# Patient Record
Sex: FEMALE | Race: AMERICAN INDIAN OR ALASKA NATIVE
[De-identification: names, ages, dates, MRNs, and addresses within clinical notes are randomized per-mention and may not be internally consistent; named-entity substitution may affect disease eponyms.]

---

## 2017-02-14 ENCOUNTER — HOSPITAL ENCOUNTER (EMERGENCY)
Dept: HOSPITAL 5 - ED | Age: 32
Discharge: HOME | End: 2017-02-14
Payer: SELF-PAY

## 2017-02-14 VITALS — SYSTOLIC BLOOD PRESSURE: 124 MMHG | DIASTOLIC BLOOD PRESSURE: 72 MMHG

## 2017-02-14 DIAGNOSIS — O24.911: ICD-10-CM

## 2017-02-14 DIAGNOSIS — O20.9: Primary | ICD-10-CM

## 2017-02-14 DIAGNOSIS — O99.331: ICD-10-CM

## 2017-02-14 DIAGNOSIS — Z3A.01: ICD-10-CM

## 2017-02-14 LAB
ANION GAP SERPL CALC-SCNC: 18 MMOL/L
BASOPHILS NFR BLD AUTO: 0.5 % (ref 0–1.8)
BILIRUB UR QL STRIP: (no result)
BLOOD UR QL VISUAL: (no result)
BUN SERPL-MCNC: 6 MG/DL (ref 7–17)
BUN/CREAT SERPL: 15 %
CALCIUM SERPL-MCNC: 9.1 MG/DL (ref 8.4–10.2)
CHLORIDE SERPL-SCNC: 94.6 MMOL/L (ref 98–107)
CO2 SERPL-SCNC: 23 MMOL/L (ref 22–30)
EOSINOPHIL NFR BLD AUTO: 1.7 % (ref 0–4.3)
GLUCOSE SERPL-MCNC: 362 MG/DL (ref 65–100)
HCT VFR BLD CALC: 39.6 % (ref 30.3–42.9)
HGB BLD-MCNC: 12.9 GM/DL (ref 10.1–14.3)
KETONES UR STRIP-MCNC: (no result) MG/DL
LEUKOCYTE ESTERASE UR QL STRIP: (no result)
MCH RBC QN AUTO: 28 PG (ref 28–32)
MCHC RBC AUTO-ENTMCNC: 33 % (ref 30–34)
MCV RBC AUTO: 86 FL (ref 79–97)
NITRITE UR QL STRIP: (no result)
PH UR STRIP: 5 [PH] (ref 5–7)
PLATELET # BLD: 193 K/MM3 (ref 140–440)
POTASSIUM SERPL-SCNC: 4.1 MMOL/L (ref 3.6–5)
PROT UR STRIP-MCNC: (no result) MG/DL
RBC # BLD AUTO: 4.62 M/MM3 (ref 3.65–5.03)
RBC #/AREA URNS HPF: 2 /HPF (ref 0–6)
SODIUM SERPL-SCNC: 131 MMOL/L (ref 137–145)
UROBILINOGEN UR-MCNC: < 2 MG/DL (ref ?–2)
WBC # BLD AUTO: 5.7 K/MM3 (ref 4.5–11)
WBC #/AREA URNS HPF: 1 /HPF (ref 0–6)

## 2017-02-14 PROCEDURE — 96361 HYDRATE IV INFUSION ADD-ON: CPT

## 2017-02-14 PROCEDURE — 82962 GLUCOSE BLOOD TEST: CPT

## 2017-02-14 PROCEDURE — 82010 KETONE BODYS QUAN: CPT

## 2017-02-14 PROCEDURE — 86901 BLOOD TYPING SEROLOGIC RH(D): CPT

## 2017-02-14 PROCEDURE — 84702 CHORIONIC GONADOTROPIN TEST: CPT

## 2017-02-14 PROCEDURE — 36415 COLL VENOUS BLD VENIPUNCTURE: CPT

## 2017-02-14 PROCEDURE — 85025 COMPLETE CBC W/AUTO DIFF WBC: CPT

## 2017-02-14 PROCEDURE — 80048 BASIC METABOLIC PNL TOTAL CA: CPT

## 2017-02-14 PROCEDURE — 99284 EMERGENCY DEPT VISIT MOD MDM: CPT

## 2017-02-14 PROCEDURE — 76801 OB US < 14 WKS SINGLE FETUS: CPT

## 2017-02-14 PROCEDURE — 86850 RBC ANTIBODY SCREEN: CPT

## 2017-02-14 PROCEDURE — 81001 URINALYSIS AUTO W/SCOPE: CPT

## 2017-02-14 PROCEDURE — 82805 BLOOD GASES W/O2 SATURATION: CPT

## 2017-02-14 PROCEDURE — 76817 TRANSVAGINAL US OBSTETRIC: CPT

## 2017-02-14 PROCEDURE — 96374 THER/PROPH/DIAG INJ IV PUSH: CPT

## 2017-02-14 PROCEDURE — 86900 BLOOD TYPING SEROLOGIC ABO: CPT

## 2017-02-14 NOTE — EMERGENCY DEPARTMENT REPORT
HPI





- General


Chief Complaint: Vaginal Bleeding


Time Seen by Provider: 02/14/17 10:20





- HPI


HPI: 





 


Chief complaint: Vaginal bleeding


HPI: Patient is a 31-year-old female with no previous pregnancies he states her 

last menstrual period started 12/31/2016 and had a positive home pregnancy 

test.  Patient states she occasionally vomits after eating for the last several 

days and for the last 2 weeks had polyuria and polydipsia.  Patient has no 

previous history of diabetes states her father does have diabetes.  Patient is 

unaware for mother had gestational diabetes.  Patient states she has 

intermittent lower abdominal cramping that is very mild and none currently.  

Patient states she had slight spotting yesterday but has not had any bleeding 

today.


Mode of arrival:   private car


Source: Patient


Began: Yesterday


Duration: One day


Context: See above


Quality: No pain currently


Severity: 0 out of 10


Improved with: Nothing


Worsened with: Nothing


Associated signs and symptoms: See above 





ED Past Medical Hx





- Past Medical History


Previous Medical History?: No





- Family History


Family history: diabetes (other)





- Social History


Smoking Status: Former Smoker


Substance Use Type: Alcohol (occasional currently not drinking)





- Medications


Home Medications: 


 Home Medications











 Medication  Instructions  Recorded  Confirmed  Last Taken  Type


 


glyBURIDE [Diabeta] 5 mg PO QHS #30 tablet 02/14/17  Unknown Rx














ED Review of Systems


ROS: 


Stated complaint: VAG BLEED, 6 WKS PREG


Other details as noted in HPI


ROS





Constitutional: No fever 


ENT: No uri symptoms


Cardiovascular: No chest pain


Respiratory: No sob or cough


GI: No diarrhea


: No dysuria or urgency, 


Skin: No rash


Neuro: No focal weakness or numbness


Psych: No depression


Hema/lymph: No edema





Physical Exam





- Physical Exam


Vital Signs: 


 Vital Signs











  02/14/17





  04:58


 


Temperature 98.1 F


 


Pulse Rate 88


 


Respiratory 20





Rate 


 


Blood Pressure 154/104


 


O2 Sat by Pulse 100





Oximetry 











Physical Exam: 





GENERAL: The patient is an obese -American female in no acute distress. 


HEENT: Normocephalic.  Atraumatic.  Extraocular motions are intact.  Patient 

has moist mucous membranes.


NECK: Supple.  No meningitic signs are noted.  There is no adenopathy noted.


CHEST/LUNGS: Clear to auscultation.  There is no respiratory distress noted.


HEART/CARDIOVASCULAR: Regular.  There is no tachycardia.  There is no gallop 

rub or murmur.


ABDOMEN: Abdomen is soft, nontender.  Patient has normal bowel sounds.  There 

is no abdominal distention.


SKIN: There is no rash.  There is no edema.  There is no diaphoresis.


NEURO: The patient is awake, alert, and oriented.  The patient is cooperative.  

The patient has no focal neurologic deficits.  The patient has normal speech.


MUSCULOSKELETAL: There is no tenderness or deformity.  There is no limitation 

range of motion.  There is no evidence of acute injury.





ED Course


 Vital Signs











  02/14/17





  04:58


 


Temperature 98.1 F


 


Pulse Rate 88


 


Respiratory 20





Rate 


 


Blood Pressure 154/104


 


O2 Sat by Pulse 100





Oximetry 














- Reevaluation(s)


Reevaluation #1: 





02/14/17 10:46


Patient's Accu-Chek was 340 and 2 L of normal saline will be started on the 

patient.


02/14/17 14:07


Repeat blood sugar after 2 L of normal saline and 5 units of regular insulin 

was 280.  Patient given a third liter of normal saline.





ED Medical Decision Making





- Lab Data


Result diagrams: 


 02/14/17 05:13





 02/14/17 10:39





 Laboratory Tests











  02/14/17 02/14/17 02/14/17





  05:13 11:05 Unknown


 


VBG pH   7.351 


 


HCG, Quant  765.1 H  


 


Ur Specific Gravity    1.031 H


 


Urine Protein    <15 mg/dl


 


Urine Glucose (UA)    >=500


 


Urine Ketones    Neg


 


Urine Blood    Lg


 


Ur Leukocyte Esterase    Neg


 


Urine WBC (Auto)    1.0


 


Urine RBC (Auto)    2.0











Critical care attestation.: 


If time is entered above; I have spent that time in minutes in the direct care 

of this critically ill patient, excluding procedure time.








ED Disposition


Clinical Impression: 


 Early stage of pregnancy, Vaginal bleeding before 22 weeks gestation, Diabetes 

mellitus, new onset





Disposition: DISCHARGED TO HOME OR SELFCARE


Is pt being admited?: No


Does the pt Need Aspirin: No


Instructions:  Ectopic Pregnancy (ED), Threatened Miscarriage (ED), Diabetes 

Mellitus Type 2 in Adults (ED)


Prescriptions: 


glyBURIDE [Diabeta] 5 mg PO QHS #30 tablet


Referrals: 


[facility] [Provider Group] - 02/16/17 9:00 am (You have an 

appointment with Dr. Mensah at 9:00 on Thursday morning.  You need to follow 

up.)


Time of Disposition: 14:05

## 2017-02-14 NOTE — ULTRASOUND REPORT
ULTRASOUND OB LESS THAN 14 WEEKS FETUS

ULTRASOUND OB TRANSVAGINAL



HISTORY: Vaginal bleeding during pregnancy, data hCG level measures 765.



TECHNIQUE: Transabdominal and transvaginal ultrasound.



The uterus measures 11 x 5 x 5 cm. No uterine mass is appreciated.



The endometrial stripe appears thickened on the transvaginal images 

measuring up to 2 cm. No intrauterine pregnancy is demonstrated. 

Spontaneous  with retained products of conception cannot be 

excluded.



The right ovary is not visualized. The left ovary contains a 1.5 cm 

cyst and a large unilocular 5.2 cm cyst.



No pelvic fluid collection.



IMPRESSION:

No intrauterine pregnancy is visualized. The endometrial stripe is 

thickened on the transvaginal images concerning for spontaneous 

 with retained products of conception. Please correlate with 

the patient's clinical presentation.

Left ovarian cysts.

## 2017-03-02 ENCOUNTER — HOSPITAL ENCOUNTER (EMERGENCY)
Dept: HOSPITAL 5 - ED | Age: 32
Discharge: HOME | End: 2017-03-02
Payer: MEDICAID

## 2017-03-02 VITALS — DIASTOLIC BLOOD PRESSURE: 79 MMHG | SYSTOLIC BLOOD PRESSURE: 122 MMHG

## 2017-03-02 DIAGNOSIS — F17.200: ICD-10-CM

## 2017-03-02 DIAGNOSIS — Z3A.01: ICD-10-CM

## 2017-03-02 DIAGNOSIS — O99.331: ICD-10-CM

## 2017-03-02 DIAGNOSIS — O24.911: ICD-10-CM

## 2017-03-02 DIAGNOSIS — O20.0: Primary | ICD-10-CM

## 2017-03-02 LAB
ANION GAP SERPL CALC-SCNC: 19 MMOL/L
BASOPHILS NFR BLD AUTO: 0.6 % (ref 0–1.8)
BILIRUB UR QL STRIP: (no result)
BLOOD UR QL VISUAL: (no result)
BUN SERPL-MCNC: 10 MG/DL (ref 7–17)
BUN/CREAT SERPL: 25 %
CALCIUM SERPL-MCNC: 9 MG/DL (ref 8.4–10.2)
CHLORIDE SERPL-SCNC: 100.1 MMOL/L (ref 98–107)
CO2 SERPL-SCNC: 20 MMOL/L (ref 22–30)
EOSINOPHIL NFR BLD AUTO: 2.5 % (ref 0–4.3)
GLUCOSE SERPL-MCNC: 155 MG/DL (ref 65–100)
HCT VFR BLD CALC: 36.4 % (ref 30.3–42.9)
HGB BLD-MCNC: 11.9 GM/DL (ref 10.1–14.3)
KETONES UR STRIP-MCNC: (no result) MG/DL
LEUKOCYTE ESTERASE UR QL STRIP: (no result)
MCH RBC QN AUTO: 28 PG (ref 28–32)
MCHC RBC AUTO-ENTMCNC: 33 % (ref 30–34)
MCV RBC AUTO: 86 FL (ref 79–97)
NITRITE UR QL STRIP: (no result)
PH UR STRIP: 5 [PH] (ref 5–7)
PLATELET # BLD: 218 K/MM3 (ref 140–440)
POTASSIUM SERPL-SCNC: 4 MMOL/L (ref 3.6–5)
RBC # BLD AUTO: 4.23 M/MM3 (ref 3.65–5.03)
RBC #/AREA URNS HPF: > 182 /HPF (ref 0–6)
SODIUM SERPL-SCNC: 135 MMOL/L (ref 137–145)
UROBILINOGEN UR-MCNC: < 2 MG/DL (ref ?–2)
WBC # BLD AUTO: 6.3 K/MM3 (ref 4.5–11)
WBC #/AREA URNS HPF: 5 /HPF (ref 0–6)

## 2017-03-02 PROCEDURE — 80048 BASIC METABOLIC PNL TOTAL CA: CPT

## 2017-03-02 PROCEDURE — 81001 URINALYSIS AUTO W/SCOPE: CPT

## 2017-03-02 PROCEDURE — 36415 COLL VENOUS BLD VENIPUNCTURE: CPT

## 2017-03-02 PROCEDURE — 85025 COMPLETE CBC W/AUTO DIFF WBC: CPT

## 2017-03-02 PROCEDURE — 76801 OB US < 14 WKS SINGLE FETUS: CPT

## 2017-03-02 PROCEDURE — 99284 EMERGENCY DEPT VISIT MOD MDM: CPT

## 2017-03-02 PROCEDURE — 86850 RBC ANTIBODY SCREEN: CPT

## 2017-03-02 PROCEDURE — 76817 TRANSVAGINAL US OBSTETRIC: CPT

## 2017-03-02 PROCEDURE — 84702 CHORIONIC GONADOTROPIN TEST: CPT

## 2017-03-02 PROCEDURE — 86900 BLOOD TYPING SEROLOGIC ABO: CPT

## 2017-03-02 PROCEDURE — 86901 BLOOD TYPING SEROLOGIC RH(D): CPT

## 2017-03-02 NOTE — ULTRASOUND REPORT
FINAL REPORT



PROCEDURE:  US OB EARLY



TECHNIQUE:  Real-time transabdominal sonography of the uterus,

placenta, amniotic fluid, adnexa, and fetus was performed with

image documentation. Measurements were obtained to determine

fetal age/size. M-mode Doppler was used to document fetal

heartbeat. 



HISTORY:  PREGNANT/VAG BLEEDING 



COMPARISON:  No prior studies are available for comparison.



FINDINGS:  

There is an intrauterine gestational sac measuring 11 millimeters

corresponding to an estimated gestational age of 5 weeks and 6

days. Estimated date of delivery is 10/27/2017. No fetal pole,

yolk sac or cardiac activity is identified at this time. 



There is suboptimal visualization of the ovaries. 



The uterus is unremarkable. 



There is no free pelvic fluid. 



IMPRESSION:  

Probable normal early intrauterine gestation. Followup is

recommended.

## 2017-03-02 NOTE — ULTRASOUND REPORT
FINAL REPORT



PROCEDURE:  US OB TRANSVAGINAL



TECHNIQUE:  Real-time transvaginal sonography of the uterus,

placenta, amniotic fluid, adnexa, and fetus was performed with

image documentation. Measurements were obtained to determine

fetal age/size. M-mode Doppler was used to document fetal

heartbeat. CPT 91665



HISTORY:  PREGNANT/VAG BLEEDING 



COMPARISON:  No prior studies are available for comparison.



FINDINGS:  

There is an intrauterine gestational sac measuring 11 millimeters

corresponding to an estimated gestational age of 5 weeks and 6

days. Estimated date of delivery is 10/27/2017. No fetal pole,

yolk sac or cardiac activity is identified at this time. 



There is suboptimal visualization of the ovaries. 



The uterus is unremarkable. 



There is no free pelvic fluid. 



IMPRESSION:  

Probable normal early intrauterine gestation. Followup is

recommended.

## 2017-03-02 NOTE — EMERGENCY DEPARTMENT REPORT
HPI





- General


Chief Complaint: Vaginal Bleeding


Time Seen by Provider: 03/02/17 10:19





- HPI


HPI: 





 


Chief complaint: Vaginal bleeding during pregnancy


HPI: Patient to his pregnant and had an earlier ultrasound 2 weeks ago showing 

no IUP began bleeding last night.  Patient is having some cramping.  Patient 

states she used 2 pads through the night.  Is patient's first pregnancy.


Mode of arrival:   private car 


Source: Patient 


Began: Last night


Duration: Continuous


Context: Patient was here 2 weeks ago with spotting.


Quality: Crampy


Severity: 6 out of 10


Improved with: Nothing


Worsened with: Nothing


Associated signs and symptoms: No nausea vomiting or fever 





ED Past Medical Hx





- Past Medical History


Previous Medical History?: Yes


Hx Diabetes: Yes





- Surgical History


Past Surgical History?: No





- Social History


Smoking Status: Current Some Day Smoker





- Medications


Home Medications: 


 Home Medications











 Medication  Instructions  Recorded  Confirmed  Last Taken  Type


 


glyBURIDE [Diabeta] 5 mg PO QHS #30 tablet 02/14/17  Unknown Rx














ED Review of Systems


ROS: 


Stated complaint: POSS MISCARRIAGE


Other details as noted in HPI


ROS





Constitutional: No fever 


ENT: No uri symptoms


Cardiovascular: No chest pain


Respiratory: No sob or cough


GI: No nausea vomiting or diarrhea


: No dysuria frequency or urgency, 


Skin: No rash


Neuro: No focal weakness or numbness


Psych: No depression


Hema/lymph: No edema





Physical Exam





- Physical Exam


Vital Signs: 


 Vital Signs











  03/02/17 03/02/17 03/02/17





  03:20 08:08 08:11


 


Temperature 98.5 F  


 


Pulse Rate 82  


 


Respiratory 20  





Rate   


 


Blood Pressure 140/99 129/64 129/64


 


Blood Pressure   





[Left]   


 


O2 Sat by Pulse 100  100





Oximetry   














  03/02/17 03/02/17 03/02/17





  08:21 08:27 08:28


 


Temperature  98.1 F 


 


Pulse Rate  88 


 


Respiratory  18 18





Rate   


 


Blood Pressure 128/83  


 


Blood Pressure  128/77 





[Left]   


 


O2 Sat by Pulse 100 100 





Oximetry   











Physical Exam: 





GENERAL: The patient is well-developed well-nourished . 


HEENT: Normocephalic.  Atraumatic.  Extraocular motions are intact.  Patient 

has moist mucous membranes.


NECK: Supple.  No meningitic signs are noted.  There is no adenopathy noted.


CHEST/LUNGS: Clear to auscultation.  There is no respiratory distress noted.


HEART/CARDIOVASCULAR: Regular.  There is no tachycardia.  There is no gallop 

rub or murmur.


ABDOMEN: Abdomen is soft, nontender.  Patient has normal bowel sounds.  There 

is no abdominal distention.


Pelvic exam: Os closed a few blood clots and minimal bleeding.


SKIN: There is no rash.  There is no edema.  There is no diaphoresis.


NEURO: The patient is awake, alert, and oriented.  The patient is cooperative.  

The patient has no focal neurologic deficits.  The patient has normal speech.


MUSCULOSKELETAL: There is no tenderness or deformity.  There is no limitation 

range of motion.  There is no evidence of acute injury.





ED Course


 Vital Signs











  03/02/17 03/02/17 03/02/17





  03:20 08:08 08:11


 


Temperature 98.5 F  


 


Pulse Rate 82  


 


Respiratory 20  





Rate   


 


Blood Pressure 140/99 129/64 129/64


 


Blood Pressure   





[Left]   


 


O2 Sat by Pulse 100  100





Oximetry   














  03/02/17 03/02/17 03/02/17





  08:21 08:27 08:28


 


Temperature  98.1 F 


 


Pulse Rate  88 


 


Respiratory  18 18





Rate   


 


Blood Pressure 128/83  


 


Blood Pressure  128/77 





[Left]   


 


O2 Sat by Pulse 100 100 





Oximetry   














ED Medical Decision Making





- Lab Data


Result diagrams: 


 03/02/17 03:55





 03/02/17 03:55





- Radiology Data


Radiology results: report reviewed (IUP 5 weeks 6 days no fetal pole)


Critical care attestation.: 


If time is entered above; I have spent that time in minutes in the direct care 

of this critically ill patient, excluding procedure time.








ED Disposition


Clinical Impression: 


 Threatened miscarriage





Disposition: DISCHARGED TO HOME OR SELFCARE


Is pt being admited?: No


Does the pt Need Aspirin: No


Condition: Stable


Instructions:  Threatened Miscarriage (ED)


Referrals: 


[facility] [Provider Group] - 3-5 Days


Time of Disposition: 11:08

## 2018-01-15 ENCOUNTER — HOSPITAL ENCOUNTER (INPATIENT)
Dept: HOSPITAL 5 - LD | Age: 33
LOS: 4 days | Discharge: HOME | End: 2018-01-19
Attending: OBSTETRICS & GYNECOLOGY | Admitting: OBSTETRICS & GYNECOLOGY
Payer: COMMERCIAL

## 2018-01-15 DIAGNOSIS — E66.9: ICD-10-CM

## 2018-01-15 DIAGNOSIS — E11.8: ICD-10-CM

## 2018-01-15 DIAGNOSIS — Z23: ICD-10-CM

## 2018-01-15 DIAGNOSIS — Z3A.37: ICD-10-CM

## 2018-01-15 DIAGNOSIS — O41.03X0: Primary | ICD-10-CM

## 2018-01-15 DIAGNOSIS — O61.8: ICD-10-CM

## 2018-01-15 DIAGNOSIS — Z79.84: ICD-10-CM

## 2018-01-15 LAB
HCT VFR BLD CALC: 37.3 % (ref 30.3–42.9)
HGB BLD-MCNC: 12.5 GM/DL (ref 10.1–14.3)
MCH RBC QN AUTO: 29 PG (ref 28–32)
MCHC RBC AUTO-ENTMCNC: 33 % (ref 30–34)
MCV RBC AUTO: 87 FL (ref 79–97)
PLATELET # BLD: 179 K/MM3 (ref 140–440)
RBC # BLD AUTO: 4.29 M/MM3 (ref 3.65–5.03)

## 2018-01-15 PROCEDURE — 83735 ASSAY OF MAGNESIUM: CPT

## 2018-01-15 PROCEDURE — C1765 ADHESION BARRIER: HCPCS

## 2018-01-15 PROCEDURE — 82962 GLUCOSE BLOOD TEST: CPT

## 2018-01-15 PROCEDURE — 85027 COMPLETE CBC AUTOMATED: CPT

## 2018-01-15 PROCEDURE — 85018 HEMOGLOBIN: CPT

## 2018-01-15 PROCEDURE — 86850 RBC ANTIBODY SCREEN: CPT

## 2018-01-15 PROCEDURE — 88307 TISSUE EXAM BY PATHOLOGIST: CPT

## 2018-01-15 PROCEDURE — 84450 TRANSFERASE (AST) (SGOT): CPT

## 2018-01-15 PROCEDURE — 84550 ASSAY OF BLOOD/URIC ACID: CPT

## 2018-01-15 PROCEDURE — 86592 SYPHILIS TEST NON-TREP QUAL: CPT

## 2018-01-15 PROCEDURE — 99211 OFF/OP EST MAY X REQ PHY/QHP: CPT

## 2018-01-15 PROCEDURE — 85049 AUTOMATED PLATELET COUNT: CPT

## 2018-01-15 PROCEDURE — 84460 ALANINE AMINO (ALT) (SGPT): CPT

## 2018-01-15 PROCEDURE — 86901 BLOOD TYPING SEROLOGIC RH(D): CPT

## 2018-01-15 PROCEDURE — G0463 HOSPITAL OUTPT CLINIC VISIT: HCPCS

## 2018-01-15 PROCEDURE — 36415 COLL VENOUS BLD VENIPUNCTURE: CPT

## 2018-01-15 PROCEDURE — 83615 LACTATE (LD) (LDH) ENZYME: CPT

## 2018-01-15 PROCEDURE — 85014 HEMATOCRIT: CPT

## 2018-01-15 PROCEDURE — 82565 ASSAY OF CREATININE: CPT

## 2018-01-15 PROCEDURE — 86900 BLOOD TYPING SEROLOGIC ABO: CPT

## 2018-01-15 RX ADMIN — SODIUM CHLORIDE, SODIUM LACTATE, POTASSIUM CHLORIDE, AND CALCIUM CHLORIDE SCH MLS/HR: .6; .31; .03; .02 INJECTION, SOLUTION INTRAVENOUS at 20:32

## 2018-01-15 NOTE — HISTORY AND PHYSICAL REPORT
History of Present Illness


Date of admission: 


01/15/18 16:19





Chief complaint: 


33 yo  at 37 wk+ 4d EGA with EDC of 18 sent over for induction of labor 

from [facility] for oligohydramnios with ARPITA of 4.  She also has DM 2 which has been 

controlled on Metformin after the patient declined to be treated with insulin 

and her control has improved somewhat although she readily admits to not taking 

the prescribed dose of 1000mg bid but only taking 500mg bid of Metformin.  BPP 8

/8 today and recent EFW was 6#1 oz.


No history of SROM


Cervix exam has improved from 2 weeks ago.  Vertex, FT, 30% and -3 but no 

longer unengaged.


GBS pos, O pos, Rub IM





History of present illness: 


Remainder of H&P from [facility] and confirmed today





OB Intake 


Occupation: Day Care Worker


Father of baby: Keegan Starr


FOB contact #: [phone]





Vital Signs 


Height: 59 in.    


Weight (lb): 184


BMI: 37.2


BP: 120/ 90 mm Hg


Ur. Protein: Trace


Ur. Glucose: Negative


Chief Complaint/Current Status: pt presents c/o missed period; last pap unknown 

...................................................................Rachel 

Melanie  2017 10:14 AM 





Menstrual History 


Regularity: regular


Menses every: 28 days


Duration: 5


LMP: 2017


LMP reliability: month known


LMP character: normal


Pregnancy test type: urine test   Date: 2017


BC at conception: none


Planned pregnancy? no





EDC Calculations 


LMP: 2016





EDC Confirmation:  2018


Gestational Age:  17 5/7 weeks





Past Pregnancy History 


   :      2


   Para:         0


   Spont. Ab:      1





Pregnancy # 1


   Delivery date:     3/2017


   Weeks Gestation:   5


   Delivery type:     SAB


   Comments:      no complications








Past Medical History:


   Reviewed history from 2017 and no changes required:


      Diabetes type 2 on po meds DX 17





Past Surgical History:


   Reviewed history from 2017 and no changes required:


      Negative Past Surgical History





Past Medical History 


Abnormal PAP: negative


ROB Exposure: negative


Infertility: negative


Uterine Anomaly: negative


Uterine Surgery (not C/S): negative


Other Gynecologic Problems: negative





Social Hx: Patient is single





Smoking History:


Patient has never smoked.








Infection History 


Hx of STD: none


Personal hx. of genital herpes: no


Partner hx. of genital herpes: no


Rash, Viral, or Febrile illness since last LMP? no


Varicella/Chicken Pox Status: Previous Disease


TB Risk: no





Genetic History 


 Congenital Heart Defect:


    Mom: no  Dad: no


Canavan Disease:


    Mom: no  Dad: no


Thalassemia


    Mom: no  Dad: no


Neural Tube Defect


    Mom: no  Dad: no


Down's Syndrome


    Mom: no  Dad: no


Maksim-Sachs


    Mom: no  Dad: no


Sickle Cell Disease/Trait


    Mom: no  Dad: no


Hemophilia


    Mom: no  Dad: no


Muscular Dystrophy


    Mom: no  Dad: no


Cystic Fibrosis


    Mom: no  Dad: no


[place] Chorea


    Mom: no  Dad: no


Mental Retardation


    Mom: no  Dad: no


Fragile X


    Mom: no  Dad: no


Other Genetic/Chromosomal Disorder


    Mom: no  Dad: no


Child w/other birth defect


    Mom: no  Dad: no





Enviromental Exposures 


Xray Exposure: no


Medication, drug, or alcohol use since LMP: no


Chemical/Other Exposure: no


Exposure to Cat Liter: no


Hx of Parvovirus (Fifth Disease): no


Occupational Exposure to Children: none


Active Medications (reviewed today):


GLYBURIDE 5MG () one po QHS


GLYBURIDE 5 MG ORAL TABS (GLYBURIDE) 


PRENATAL VITAMINS TABS (PRENATAL MV & MIN W/FE-FA TABS) 





Current Allergies (reviewed today):


No known allergies


Laboratory Results 





Routine Urinalysis 


Leukocytes: negative


Nitrite: negative


Urobilinogen: negative


Protein: Trace


Blood: negative


Ketone: negative


Bilirubin: negative


Glucose: Negative


Urine HCG: positive





Review of Systems 





General


     Denies fever, chills, sweats, anorexia, fatigue, weakness, malaise, weight 

loss and sleep disorder.





Prenatal


     Denies nausea, vomiting, headache, swelling of legs, abdominal pain, 

vaginal discharge, vaginal bleeding and contractions.








     Denies vaginal discharge, incontinence, dysuria, hematuria, urinary 

frequency, amenorrhea, menorrhagia, abnormal vaginal bleeding, pelvic pain, 

genital sores, decreased libido, painful periods, painful sex, urinary urgency, 

hot flashes, vaginal dryness, vaginal itching and vaginal odor.





CV


     Denies chest pains, palpitations, syncope, dyspnea on exertion, orthopnea, 

PND and peripheral edema.





Resp


     Denies cough, dyspnea at rest, excessive sputum, hemoptysis, wheezing and 

pleurisy.





GI


     Denies nausea, vomiting, diarrhea, constipation, change in bowel habits, 

abdominal pain, melena, hematochezia, jaundice, gas/bloating, indigestion/

heartburn, dysphagia and odynophagia.





Endo


     Denies cold intolerance, heat intolerance, polydipsia, polyphagia, 

polyuria and unusual weight change.





Breast


     Denies left breast lump, right breast lump, nipple discharge, bloody 

discharge from nipple, breast pain, abnormal mammogram and breast enlargement.





MS


     Denies back pain, joint pain, joint swelling, muscle cramps, muscle 

weakness, stiffness, arthritis, sciatica, restless legs, leg pain at night and 

leg pain with exertion.





Derm


     Denies rash, itching, dryness and suspicious lesions.





Neuro


     Denies paralysis, paresthesias, headache, seizures, tremors, vertigo, 

transient blindness, frequent falls, frequent headaches and difficulty walking.





Psych


     Denies depression, anxiety, irritability and mood swings.





Eyes


     Denies blurring, diplopia, irritation, discharge, vision loss, eye pain 

and photophobia.





ENT


     Denies earache, ear discharge, tinnitus, decreased hearing, nasal 

congestion, nosebleeds, sore throat and hoarseness.





Allergy


     Denies urticaria, allergic rash, hay fever and recurrent infections.





Heme


     Denies abnormal bruising, bleeding and enlarged lymph nodes.





PHYSICAL EXAM 


HEENT: PERRLA, normal conjunctiva, external nose and nasal mucosa normal, 

oropharynx clear


Neck/Thyroid: supple, thyroid normal


Skin no significant abnormal lesions or rashes


Chest: respiratory effort normal, clear to auscultation


Breasts: normal without skin changes or masses


CV: regular, normal S1-S2, no murmur, no rub, no gallop


Abdomen: normal bowel sounds, soft, nontender, no HSM


Musculoskeletal: grossly normal ROM in joints, no joint tenderness or muscle 

weakness


Neuro: grossly normal DTRs, sensation, strength, cranial nerves


Extremities: no clubbing, cyanosis, or edema





Flowsheet View for Follow-up Visit


   Estimated weeks of


      gestation:      17 5/7


   Weight:      184


   Blood pressure:   120 / 90


   Urine protein:       Trace


   Urine glucose:    Negative


   Urine nitrite:      negative


Infant's Physician: undecided





Prenatal Education Provided:


1)  Prenatal Education provided today and information packet given.


2)  Prenatal Education packet given; please call if you have any questions.


3)  Advice on healthy diet for pregnancy reviewed and information provided.


4)  Review normal weight gain and proper nutrition during pregnancy.


5)  Elevate head of bed at least 6 inches (raise bed posts not just with pillows

); small frequent meals and take TUMS as needed.


6)  Stressed importance of good dental care and information given.


7)  Stressed the risks of alcohol and drug use in pregnancy including risk of 

premature delivery, small baby (SGA), abruption, and fetal death.


8)  Hazards of smoking and pregnancy reviewed; smoking cessation strongly 

encouraged and smoking cessation techniques reviewed.


9)  Advised to avoid intimate contact with cats, avoid cat litter, and the 

ingestion of raw meat.


10)  Reviewed recommended physical activity level during pregnancy.


11)  Seat belt use during pregnancy reviewed.


12)  Patient Will deliver at [facility].


13)   car seat safety reviewed and information given.


14)  Family support system evaluated.


15)  Adjustment in family roles after the birth of the baby discussed.


16)  Normal spontaneous vaginal delivery () anticipated at this time.








Medications and Allergies


 Allergies











Allergy/AdvReac Type Severity Reaction Status Date / Time


 


No Known Allergies Allergy   Unverified 17 04:57











 Home Medications











 Medication  Instructions  Recorded  Confirmed  Last Taken  Type


 


glyBURIDE [Diabeta] 5 mg PO QHS #30 tablet 17  Unknown Rx














Results


Result Diagrams: 


 01/15/18 17:12





All other labs normal.








Assessment and Plan





- Patient Problems


(1) Oligohydramnios


Current Visit: Yes   Status: Acute   





(2) Diabetes type 2, uncontrolled


Current Visit: Yes   Status: Acute   





(3) Pregnancy with 37 or more completed weeks gestation


Current Visit: Yes   Status: Acute

## 2018-01-16 RX ADMIN — OXYTOCIN SCH MLS/HR: 10 INJECTION, SOLUTION INTRAMUSCULAR; INTRAVENOUS at 10:45

## 2018-01-16 RX ADMIN — SODIUM CHLORIDE, SODIUM LACTATE, POTASSIUM CHLORIDE, AND CALCIUM CHLORIDE SCH MLS/HR: .6; .31; .03; .02 INJECTION, SOLUTION INTRAVENOUS at 11:27

## 2018-01-16 RX ADMIN — FENTANYL CITRATE PRN MCG: 50 INJECTION, SOLUTION INTRAMUSCULAR; INTRAVENOUS at 02:51

## 2018-01-16 RX ADMIN — OXYTOCIN SCH MLS/HR: 10 INJECTION, SOLUTION INTRAMUSCULAR; INTRAVENOUS at 11:25

## 2018-01-17 LAB
ALT SERPL-CCNC: 20 UNITS/L (ref 7–56)
URATE SERPL-MCNC: 4.1 MG/DL (ref 3.5–7.6)

## 2018-01-17 RX ADMIN — SODIUM CHLORIDE, SODIUM LACTATE, POTASSIUM CHLORIDE, AND CALCIUM CHLORIDE SCH MLS/HR: .6; .31; .03; .02 INJECTION, SOLUTION INTRAVENOUS at 10:28

## 2018-01-17 RX ADMIN — KETOROLAC TROMETHAMINE PRN MG: 30 INJECTION, SOLUTION INTRAMUSCULAR at 19:45

## 2018-01-17 RX ADMIN — FENTANYL CITRATE PRN MCG: 50 INJECTION, SOLUTION INTRAMUSCULAR; INTRAVENOUS at 00:08

## 2018-01-17 RX ADMIN — SODIUM CHLORIDE, SODIUM LACTATE, POTASSIUM CHLORIDE, AND CALCIUM CHLORIDE SCH MLS/HR: .6; .31; .03; .02 INJECTION, SOLUTION INTRAVENOUS at 11:24

## 2018-01-17 NOTE — EVENT NOTE
Date: 01/17/18 (no cervical chg X 5 hours)


Explained all findings to pt and  Discussed risks and necessity of 

operative intervention. Pt voiced understanding Consents signed.  

aware. C/S preop orders in EMR.

## 2018-01-17 NOTE — EVENT NOTE
Date: 01/17/18 (BP elevated postoperative)


[facility] labs ordered. BPs in PACU continue to be elevated despite pain meds. Upon 

my assessment pt denies any pain, denies HA, blurred vision, chest pain. BP @ 

time of assessment 163/94.


Consulted with . MGSO4 ordered. Discussed findings and POC with pt 

all questions addressed. Voiced understanding and agreement with POC.

## 2018-01-17 NOTE — ANESTHESIA DAY OF SURGERY
Anesthesia Day of Surgery





- Day of Surgery


Patient Examined: Yes


Patient H&P Reviewed: Yes


Patient is NPO: Yes


Jair's Test: N/A

## 2018-01-17 NOTE — EVENT NOTE
Date: 18





33 yo f  IUPat 37 weeks with failed induction for oligohydramnios. Options 

reviewed: continued KALYAN vs C/S. She was informed that with KALYAN she may become 

infected that may lead to poor fetal/maternal prognosis. Also discussed c/s 

with risks for bleeding, infection, injury to bowel/bladder and possible need 

for c/s with all subsequent pregnancy. She desires to proceed with c/s. 

Questions were encouraged and answered, consents were reviewed and signed. She 

voiced understanding.

## 2018-01-17 NOTE — ANESTHESIA CONSULTATION
Anesthesia Consult and Med Hx


Date of service: 01/17/18





- Airway


Anesthetic Teeth Evaluation: Good


ROM Head & Neck: Adequate


Mental/Hyoid Distance: Adequate


Mallampati Class: Class II


Intubation Access Assessment: Probably Good





- Pulmonary Exam


CTA: Yes





- Cardiac Exam


Cardiac Exam: RRR





- Pre-Operative Health Status


ASA Pre-Surgery Classification: ASA2


Proposed Anesthetic Plan: Epidural, Spinal





- Pulmonary


Hx Asthma: No


COPD: No


Hx Pneumonia: No





- Cardiovascular System


Hx Hypertension: No





- Central Nervous System


Hx Seizures: No


Hx Psychiatric Problems: No





- Endocrine


Hx Renal Disease: No


Hx End Stage Renal Disease: No


Hx Hypothyroidism: No


Hx Hyperthyroidism: No





- Hematic


Hx Anemia: No


Hx Sickle Cell Disease: No





- Other Systems


Hx Alcohol Use: No


Hx Obesity: Yes





- Additional Comments


Anesthesia Medical History Comments: +IUP

## 2018-01-18 LAB
HCT VFR BLD CALC: 29 % (ref 30.3–42.9)
HGB BLD-MCNC: 9.5 GM/DL (ref 10.1–14.3)

## 2018-01-18 RX ADMIN — KETOROLAC TROMETHAMINE PRN MG: 30 INJECTION, SOLUTION INTRAMUSCULAR at 06:15

## 2018-01-18 RX ADMIN — METFORMIN HYDROCHLORIDE SCH: 500 TABLET ORAL at 19:40

## 2018-01-18 RX ADMIN — CEFAZOLIN SCH MLS/10 MIN: 10 INJECTION, POWDER, FOR SOLUTION INTRAVENOUS at 02:28

## 2018-01-18 RX ADMIN — KETOROLAC TROMETHAMINE PRN MG: 30 INJECTION, SOLUTION INTRAMUSCULAR at 17:53

## 2018-01-18 RX ADMIN — CEFAZOLIN SCH MLS/10 MIN: 10 INJECTION, POWDER, FOR SOLUTION INTRAVENOUS at 10:08

## 2018-01-19 VITALS — SYSTOLIC BLOOD PRESSURE: 97 MMHG | DIASTOLIC BLOOD PRESSURE: 63 MMHG

## 2018-01-19 PROCEDURE — 3E0234Z INTRODUCTION OF SERUM, TOXOID AND VACCINE INTO MUSCLE, PERCUTANEOUS APPROACH: ICD-10-PCS | Performed by: OBSTETRICS & GYNECOLOGY

## 2018-01-19 RX ADMIN — OXYCODONE AND ACETAMINOPHEN PRN TAB: 5; 325 TABLET ORAL at 00:13

## 2018-01-19 RX ADMIN — OXYCODONE AND ACETAMINOPHEN PRN TAB: 5; 325 TABLET ORAL at 06:06

## 2018-01-19 RX ADMIN — METFORMIN HYDROCHLORIDE SCH MG: 500 TABLET ORAL at 12:30

## 2018-01-19 RX ADMIN — OXYCODONE AND ACETAMINOPHEN PRN TAB: 5; 325 TABLET ORAL at 12:35

## 2018-01-19 NOTE — DISCHARGE SUMMARY
Providers





- Providers


Date of Admission: 


01/15/18 16:19





Date of discharge: 18


Attending physician: 


JOSEP ESPINOZA





 





18 21:49


Consult to Lactation Consultant [CONS] Routine 


   Reason For Exam: 











Primary care physician: 


DEISI BURTON








Hospitalization


Reason for admission: Induction of labor for Oliohydramnios, DM


Condition: Good


Procedures: 


 primary c/s after unsuccessful induction 





Hospital course: 


 uncomplicated c/s





Disposition: - TO HOME OR SELFCARE





- Discharge Diagnoses


(1) Diabetes type 2, uncontrolled


Status: Acute   


Qualifiers: 


   Diabetes mellitus complication status: without complication   Diabetes 

mellitus long term insulin use: without long term use   Qualified Code(s): 

E11.65 - Type 2 diabetes mellitus with hyperglycemia   





(2)  delivery delivered


Status: Acute   





Core Measure Documentation





- Palliative Care


Palliative Care/ Comfort Measures: Not Applicable





- Core Measures


Any of the following diagnoses?: none





Exam





- Constitutional


Vitals: 


 











Temp Pulse Resp BP Pulse Ox


 


 98.8 F   80   20   103/63   100 


 


 18 00:37  18 00:37  18 06:06  18 00:37  18 00:37











General appearance: Present: no acute distress





- EENT


Eyes: Present: PERRL


ENT: hearing intact, clear oral mucosa





- Neck


Neck: Present: supple, normal ROM





- Respiratory


Respiratory effort: normal


Respiratory: bilateral: CTA





- Cardiovascular


Heart Sounds: Present: S1 & S2.  Absent: rub, click





- Extremities


Extremities: pulses symmetrical, No edema





- Abdominal


General gastrointestinal: Present: soft, non-tender, distended (pt has yet to 

pass rose, rn will give meds to help pt pass gas)


Female genitourinary: Present: normal





- Integumentary


Integumentary: Present: clear, warm, dry





- Musculoskeletal


Musculoskeletal: gait normal, strength equal bilaterally





- Psychiatric


Psychiatric: appropriate mood/affect, intact judgment & insight





- Neurologic


Neurologic: CNII-XII intact, moves all extremities





- Additional findings


Additional findings: 


 Fundus firm, lochia scant, incision D&I








Plan


Activity: advance as tolerated


Weight Bearing Status: Full Weight Bearing


Diet: regular


Wound: open to air, keep clean and dry


Follow up with: 


DEISI BURTON MD [Primary Care Provider] - 7 Days


PAWAN BURNHAM MD [Staff Physician] - 7 Days (Congratulations!  Please 

call [phone] to schedule your incision check and your son's circumcision 

in 1 week. Bring EMLA cream to your son's appointment and await further 

instructions.  Call for any questions or concerns.)


Prescriptions: 


Ibuprofen [Motrin 800 MG tab] 800 mg PO TID PRN #30 tablet


 PRN Reason: Pain


Lidocain2.5%/Prilocai2.5% [Emla] 5 gm TP ONCE #1 tube


oxyCODONE /ACETAMINOPHEN [Percocet 5/325 mg] 1 - 2 tab PO Q4HR PRN #30 tablet


 PRN Reason: Pain

## 2019-08-28 NOTE — PROGRESS NOTE
Assessment and Plan





- Patient Problems


(1) Diabetes type 2, uncontrolled


Current Visit: Yes   Status: Acute   


Qualifiers: 


   Diabetes mellitus complication status: without complication 


Plan to address problem: 


pt anxious to move forward Ask for c/s Strongly encouraged pt to try IOL today 

Will bolus for epidural AM care Start pitocin per protocol. All questions 

addressed.


SVE 2-3,50,-2








(2) Oligohydramnios


Current Visit: Yes   Status: Acute   


Qualifiers: 


   Fetus number: single or unspecified fetus   Trimester: third trimester   

Qualified Code(s): O41.03X0 - Oligohydramnios, third trimester, not applicable 

or unspecified   





Subjective





- Subjective


Date of service: 01/17/18 (pt A&O Agrees to move forward with IOL; desires 

epidural)


Principal diagnosis: IUP @ 37+6 weeks, DM, oligio


Patient reports: fetal movement normal, no new complaints





Objective





- Vital Signs


Vital Signs: 


 Vital Signs - 12hr











  01/16/18 01/16/18 01/16/18





  22:28 22:29 22:34


 


Temperature   


 


Pulse Rate 80 82 81


 


Respiratory   





Rate   


 


Blood Pressure 122/65  


 


O2 Sat by Pulse  97 97





Oximetry   














  01/16/18 01/16/18 01/16/18





  22:39 22:44 22:49


 


Temperature   


 


Pulse Rate 77 76 74


 


Respiratory   





Rate   


 


Blood Pressure   


 


O2 Sat by Pulse 97 96 98





Oximetry   














  01/16/18 01/16/18 01/16/18





  22:54 22:59 23:00


 


Temperature   97.8 F


 


Pulse Rate 76 75 


 


Respiratory   18





Rate   


 


Blood Pressure   


 


O2 Sat by Pulse 96 97 





Oximetry   














  01/16/18 01/16/18 01/16/18





  23:04 23:09 23:14


 


Temperature   


 


Pulse Rate 72 75 78


 


Respiratory   





Rate   


 


Blood Pressure   


 


O2 Sat by Pulse 96 96 97





Oximetry   














  01/16/18 01/17/18 01/17/18





  23:19 02:24 02:25


 


Temperature  98.3 F 


 


Pulse Rate 75  77


 


Respiratory  20 





Rate   


 


Blood Pressure   134/60


 


O2 Sat by Pulse 97  98





Oximetry   














  01/17/18 01/17/18





  05:59 06:00


 


Temperature  98.2 F


 


Pulse Rate 73 


 


Respiratory  18





Rate  


 


Blood Pressure 132/73 


 


O2 Sat by Pulse  





Oximetry  














- Exam


Breasts: normal


Cardiovascular: Regular rate


Lungs: Normal air movement


Abdomen: Present: normal appearance, soft.  Absent: distention, tenderness


Uterus: Present: normal


FHR: auscultation normal, category 1


Uterine Contraction Monitor Mode: External


Cervical Dilatation: 2.5


Cervical Effacement Percentage: 50


Fetal station: -2


Uterine Contraction Pattern: Irregular


Uterine Tone Measurement Phase: Resting


Uterine Contraction Intensity: Mild


Extremities: edema


Deep Tendon Reflex Grade: Normal +2





- Labs


Labs: 


 Abnormal Labs











  01/16/18 01/17/18 01/17/18





  20:16 05:57 06:55


 


Creatinine    0.4 L


 


POC Glucose  133 H  68 L 








 Laboratory Results - last 24 hr











  01/15/18 01/16/18 01/17/18





  17:12 20:16 05:57


 


Creatinine   


 


Estimated GFR   


 


POC Glucose   133 H  68 L


 


RPR  Nonreactive  














  01/17/18





  06:55


 


Creatinine  0.4 L


 


Estimated GFR  > 60


 


POC Glucose 


 


RPR
Assessment and Plan





- Patient Problems


(1) Diabetes type 2, uncontrolled


Onset Date: Unknown   Current Visit: Yes   Status: Acute   


Qualifiers: 


   Diabetes mellitus complication status: without complication 





(2) Oligohydramnios


Onset Date: ~01/17/18   Current Visit: Yes   Status: Acute   


Qualifiers: 


   Fetus number: single or unspecified fetus   Trimester: third trimester   

Qualified Code(s): O41.03X0 - Oligohydramnios, third trimester, not applicable 

or unspecified   


Plan to address problem: 


Pit @ 24mu Position chges done SVE No chg. Pt turned to extreme right side Pit 

increased to 28mu. Will re-eval 1 hour.  aware.








Subjective





- Subjective


Date of service: 01/17/18 (pt c/o back pain and pressure)


Principal diagnosis: IUP @ 37+6 weeks, DM, oligio


Patient reports: fetal movement normal, no new complaints





Objective





- Vital Signs


Vital Signs: 


 Vital Signs - 12hr











  01/17/18 01/17/18 01/17/18





  05:59 06:00 09:56


 


Temperature  98.2 F 


 


Pulse Rate 73  78


 


Respiratory  18 





Rate   


 


Blood Pressure 132/73  117/62


 


Blood Pressure   





[Left]   


 


O2 Sat by Pulse   





Oximetry   














  01/17/18 01/17/18 01/17/18





  09:59 11:30 11:48


 


Temperature 97.6 F  


 


Pulse Rate 117 H 75 76


 


Respiratory   





Rate   


 


Blood Pressure  123/78 118/75


 


Blood Pressure 117/62  





[Left]   


 


O2 Sat by Pulse   





Oximetry   














  01/17/18 01/17/18 01/17/18





  11:55 11:56 11:58


 


Temperature   


 


Pulse Rate 83 78 76


 


Respiratory   





Rate   


 


Blood Pressure  119/79 119/76


 


Blood Pressure   





[Left]   


 


O2 Sat by Pulse 100  





Oximetry   














  01/17/18 01/17/18 01/17/18





  12:00 12:02 12:04


 


Temperature   


 


Pulse Rate 77 71 77


 


Respiratory   





Rate   


 


Blood Pressure 125/78 133/82 129/80


 


Blood Pressure   





[Left]   


 


O2 Sat by Pulse 100  





Oximetry   














  01/17/18 01/17/18 01/17/18





  12:05 12:06 12:08


 


Temperature   


 


Pulse Rate 77 77 82


 


Respiratory   





Rate   


 


Blood Pressure  144/83 144/88


 


Blood Pressure   





[Left]   


 


O2 Sat by Pulse 100  





Oximetry   














  01/17/18 01/17/18 01/17/18





  12:10 12:12 12:14


 


Temperature   


 


Pulse Rate 85 82 81


 


Respiratory   





Rate   


 


Blood Pressure 139/87 138/86 135/82


 


Blood Pressure   





[Left]   


 


O2 Sat by Pulse 99  





Oximetry   














  01/17/18 01/17/18 01/17/18





  12:15 12:16 12:17


 


Temperature   


 


Pulse Rate 82 93 H 34 L


 


Respiratory   





Rate   


 


Blood Pressure  125/88 


 


Blood Pressure   





[Left]   


 


O2 Sat by Pulse 98  86





Oximetry   














  01/17/18 01/17/18 01/17/18





  12:18 12:20 12:22


 


Temperature   


 


Pulse Rate 82 84 83


 


Respiratory   





Rate   


 


Blood Pressure 129/81 128/86 130/82


 


Blood Pressure   





[Left]   


 


O2 Sat by Pulse  98 





Oximetry   














  01/17/18 01/17/18 01/17/18





  12:24 12:25 12:26


 


Temperature   


 


Pulse Rate 76 77 77


 


Respiratory   





Rate   


 


Blood Pressure 127/81  136/86


 


Blood Pressure   





[Left]   


 


O2 Sat by Pulse  98 





Oximetry   














  01/17/18 01/17/18 01/17/18





  12:28 12:30 12:35


 


Temperature   


 


Pulse Rate 76 76 85


 


Respiratory   





Rate   


 


Blood Pressure 133/83  


 


Blood Pressure   





[Left]   


 


O2 Sat by Pulse  97 99





Oximetry   














  01/17/18 01/17/18 01/17/18





  12:39 12:40 12:45


 


Temperature   


 


Pulse Rate 72 70 72


 


Respiratory   





Rate   


 


Blood Pressure 134/79  


 


Blood Pressure   





[Left]   


 


O2 Sat by Pulse  97 97





Oximetry   














  01/17/18 01/17/18 01/17/18





  12:48 12:49 12:50


 


Temperature   


 


Pulse Rate 68 70 69


 


Respiratory   





Rate   


 


Blood Pressure 133/81  


 


Blood Pressure   





[Left]   


 


O2 Sat by Pulse  81 L 97





Oximetry   














  01/17/18 01/17/18 01/17/18





  12:56 12:58 13:01


 


Temperature   


 


Pulse Rate 71 68 67


 


Respiratory   





Rate   


 


Blood Pressure  138/85 


 


Blood Pressure   





[Left]   


 


O2 Sat by Pulse 98  100





Oximetry   














  01/17/18 01/17/18 01/17/18





  13:06 13:08 13:11


 


Temperature   


 


Pulse Rate 69 68 70


 


Respiratory   





Rate   


 


Blood Pressure  139/86 


 


Blood Pressure   





[Left]   


 


O2 Sat by Pulse 100  99





Oximetry   














  01/17/18 01/17/18 01/17/18





  13:16 13:18 13:21


 


Temperature   


 


Pulse Rate 72 70 72


 


Respiratory   





Rate   


 


Blood Pressure  135/88 


 


Blood Pressure   





[Left]   


 


O2 Sat by Pulse 99  99





Oximetry   














  01/17/18 01/17/18 01/17/18





  13:26 13:28 13:31


 


Temperature   


 


Pulse Rate 75 71 73


 


Respiratory   





Rate   


 


Blood Pressure  154/86 


 


Blood Pressure   





[Left]   


 


O2 Sat by Pulse 98  97





Oximetry   














  01/17/18 01/17/18 01/17/18





  13:36 13:40 13:41


 


Temperature   


 


Pulse Rate 69 71 71


 


Respiratory   





Rate   


 


Blood Pressure  149/83 


 


Blood Pressure   





[Left]   


 


O2 Sat by Pulse 99  97





Oximetry   














  01/17/18 01/17/18 01/17/18





  13:46 13:51 13:56


 


Temperature   


 


Pulse Rate 72 71 75


 


Respiratory   





Rate   


 


Blood Pressure   


 


Blood Pressure   





[Left]   


 


O2 Sat by Pulse 97 99 98





Oximetry   














  01/17/18 01/17/18 01/17/18





  14:01 14:06 14:11


 


Temperature   


 


Pulse Rate 74 76 71


 


Respiratory   





Rate   


 


Blood Pressure   


 


Blood Pressure   





[Left]   


 


O2 Sat by Pulse 98 97 97





Oximetry   














  01/17/18 01/17/18 01/17/18





  14:15 14:16 14:17


 


Temperature   97.5 F L


 


Pulse Rate 71 73 73


 


Respiratory   18





Rate   


 


Blood Pressure 148/85  


 


Blood Pressure   148/85





[Left]   


 


O2 Sat by Pulse  99 99





Oximetry   














  01/17/18 01/17/18 01/17/18





  14:21 14:26 14:31


 


Temperature   


 


Pulse Rate 72 71 71


 


Respiratory   





Rate   


 


Blood Pressure   


 


Blood Pressure   





[Left]   


 


O2 Sat by Pulse 99 98 98





Oximetry   














  01/17/18 01/17/18 01/17/18





  14:36 14:41 14:46


 


Temperature   


 


Pulse Rate 72 70 71


 


Respiratory   





Rate   


 


Blood Pressure   


 


Blood Pressure   





[Left]   


 


O2 Sat by Pulse 96 97 97





Oximetry   














  01/17/18 01/17/18 01/17/18





  14:49 14:51 14:56


 


Temperature   


 


Pulse Rate 71 70 72


 


Respiratory   





Rate   


 


Blood Pressure 135/82  


 


Blood Pressure   





[Left]   


 


O2 Sat by Pulse  99 96





Oximetry   














  01/17/18 01/17/18 01/17/18





  15:01 15:06 15:11


 


Temperature   


 


Pulse Rate 71 74 72


 


Respiratory   





Rate   


 


Blood Pressure   


 


Blood Pressure   





[Left]   


 


O2 Sat by Pulse 96 97 98





Oximetry   














  01/17/18 01/17/18 01/17/18





  15:16 15:21 15:26


 


Temperature   


 


Pulse Rate 71 77 71


 


Respiratory   





Rate   


 


Blood Pressure   


 


Blood Pressure   





[Left]   


 


O2 Sat by Pulse 98 99 96





Oximetry   














  01/17/18 01/17/18 01/17/18





  15:31 15:36 15:41


 


Temperature   


 


Pulse Rate 72 74 71


 


Respiratory   





Rate   


 


Blood Pressure   


 


Blood Pressure   





[Left]   


 


O2 Sat by Pulse 98 98 98





Oximetry   














  01/17/18





  15:46


 


Temperature 


 


Pulse Rate 75


 


Respiratory 





Rate 


 


Blood Pressure 


 


Blood Pressure 





[Left] 


 


O2 Sat by Pulse 98





Oximetry 














- Exam


Cardiovascular: Regular rate


Lungs: Clear to auscultation, Normal air movement


Abdomen: Present: normal appearance, soft.  Absent: distention, tenderness


Uterus: Present: normal


FHR: auscultation normal, category 1


Uterine Contraction Monitor Mode: Internal


Cervical Dilatation: 2


Cervical Effacement Percentage: 50


Fetal station: -2


Uterine Contraction Pattern: Regular


Uterine Tone Measurement Phase: Contraction


Uterine Contraction Intensity: Moderate


Extremities: edema


Deep Tendon Reflex Grade: Normal +2





- Labs


Labs: 


 Abnormal Labs











  01/16/18 01/17/18 01/17/18





  20:16 05:57 06:55


 


Creatinine    0.4 L


 


POC Glucose  133 H  68 L 








 Laboratory Results - last 24 hr











  01/16/18 01/17/18 01/17/18





  20:16 05:57 06:55


 


Creatinine    0.4 L


 


Estimated GFR    > 60


 


POC Glucose  133 H  68 L 














  01/17/18





  12:56


 


Creatinine 


 


Estimated GFR 


 


POC Glucose  100
Assessment and Plan





- Patient Problems


(1) Diabetes type 2, uncontrolled


Onset Date: Unknown   Current Visit: Yes   Status: Acute   


Qualifiers: 


   Diabetes mellitus complication status: without complication 


Plan to address problem: 


continue metformin BID  Monitor BS








(2) Oligohydramnios


Onset Date: ~01/17/18   Current Visit: Yes   Status: Acute   


Qualifiers: 


   Fetus number: single or unspecified fetus   Trimester: third trimester   

Qualified Code(s): O41.03X0 - Oligohydramnios, third trimester, not applicable 

or unspecified   


Plan to address problem: 


Pt comfortable with epidural Pit @ 12 mu ISE / IUPC placed Re-eval as needed








Subjective





- Subjective


Date of service: 01/17/18 (pt comfortable with epidural)


Principal diagnosis: IUP @ 37+6 weeks, DM, oligio


Patient reports: fetal movement normal, no new complaints





Objective





- Vital Signs


Vital Signs: 


 Vital Signs - 12hr











  01/17/18 01/17/18 01/17/18





  02:24 02:25 05:59


 


Temperature 98.3 F  


 


Pulse Rate  77 73


 


Respiratory 20  





Rate   


 


Blood Pressure  134/60 132/73


 


Blood Pressure   





[Left]   


 


O2 Sat by Pulse  98 





Oximetry   














  01/17/18 01/17/18 01/17/18





  06:00 09:56 09:59


 


Temperature 98.2 F  97.6 F


 


Pulse Rate  78 117 H


 


Respiratory 18  





Rate   


 


Blood Pressure  117/62 


 


Blood Pressure   117/62





[Left]   


 


O2 Sat by Pulse   





Oximetry   














  01/17/18 01/17/18 01/17/18





  11:30 11:48 11:55


 


Temperature   


 


Pulse Rate 75 76 83


 


Respiratory   





Rate   


 


Blood Pressure 123/78 118/75 


 


Blood Pressure   





[Left]   


 


O2 Sat by Pulse   100





Oximetry   














  01/17/18 01/17/18 01/17/18





  11:56 11:58 12:00


 


Temperature   


 


Pulse Rate 78 76 77


 


Respiratory   





Rate   


 


Blood Pressure 119/79 119/76 125/78


 


Blood Pressure   





[Left]   


 


O2 Sat by Pulse   100





Oximetry   














  01/17/18 01/17/18 01/17/18





  12:02 12:04 12:05


 


Temperature   


 


Pulse Rate 71 77 77


 


Respiratory   





Rate   


 


Blood Pressure 133/82 129/80 


 


Blood Pressure   





[Left]   


 


O2 Sat by Pulse   100





Oximetry   














  01/17/18 01/17/18 01/17/18





  12:06 12:08 12:10


 


Temperature   


 


Pulse Rate 77 82 85


 


Respiratory   





Rate   


 


Blood Pressure 144/83 144/88 139/87


 


Blood Pressure   





[Left]   


 


O2 Sat by Pulse   99





Oximetry   














  01/17/18 01/17/18 01/17/18





  12:12 12:14 12:15


 


Temperature   


 


Pulse Rate 82 81 82


 


Respiratory   





Rate   


 


Blood Pressure 138/86 135/82 


 


Blood Pressure   





[Left]   


 


O2 Sat by Pulse   98





Oximetry   














  01/17/18 01/17/18 01/17/18





  12:16 12:17 12:18


 


Temperature   


 


Pulse Rate 93 H 34 L 82


 


Respiratory   





Rate   


 


Blood Pressure 125/88  129/81


 


Blood Pressure   





[Left]   


 


O2 Sat by Pulse  86 





Oximetry   














  01/17/18 01/17/18 01/17/18





  12:20 12:22 12:24


 


Temperature   


 


Pulse Rate 84 83 76


 


Respiratory   





Rate   


 


Blood Pressure 128/86 130/82 127/81


 


Blood Pressure   





[Left]   


 


O2 Sat by Pulse 98  





Oximetry   














  01/17/18 01/17/18 01/17/18





  12:25 12:26 12:28


 


Temperature   


 


Pulse Rate 77 77 76


 


Respiratory   





Rate   


 


Blood Pressure  136/86 133/83


 


Blood Pressure   





[Left]   


 


O2 Sat by Pulse 98  





Oximetry   














  01/17/18 01/17/18





  12:30 12:35


 


Temperature  


 


Pulse Rate 76 85


 


Respiratory  





Rate  


 


Blood Pressure  


 


Blood Pressure  





[Left]  


 


O2 Sat by Pulse 97 99





Oximetry  














- Exam


Breasts: deferred


Cardiovascular: Regular rate


Lungs: Normal air movement


Abdomen: Present: normal appearance, soft, normal bowel sounds


Uterus: Present: normal


Uterine Contraction Monitor Mode: Internal


Cervical Dilatation: 2 (ISE/IUPC inserted)


Cervical Effacement Percentage: 50


Fetal station: -2


Uterine Contraction Pattern: Regular


Uterine Tone Measurement Phase: Resting


Uterine Contraction Intensity: Moderate


Extremities: normal


Deep Tendon Reflex Grade: Normal +2





- Labs


Labs: 


 Abnormal Labs











  01/16/18 01/17/18 01/17/18





  20:16 05:57 06:55


 


Creatinine    0.4 L


 


POC Glucose  133 H  68 L 








 Laboratory Results - last 24 hr











  01/16/18 01/17/18 01/17/18





  20:16 05:57 06:55


 


Creatinine    0.4 L


 


Estimated GFR    > 60


 


POC Glucose  133 H  68 L
Assessment and Plan





- Patient Problems


(1) Diabetes type 2, uncontrolled


Onset Date: Unknown   Current Visit: Yes   Status: Acute   


Qualifiers: 


   Diabetes mellitus complication status: without complication   Diabetes 

mellitus long term insulin use: without long term use   Qualified Code(s): 

E11.65 - Type 2 diabetes mellitus with hyperglycemia   


Plan to address problem: 


continue to check BS Will resume metformin when pt resumes diet








(2)  delivery delivered


Onset Date: ~18   Current Visit: Yes   Status: Acute   


Plan to address problem: 


pt resting quietly no c/o voiced /80 no c/o HA, blurred vision, chest 

pain FF below umb Lochia small Dressing D&I H&H pending No s/sx of anemia MGSO4 

infusing 2gm/hr Mag level 4.5 Doing well s/p  section Elevated BP 

postpartum. P: continue MGSO4 due to d/c @ 2100 Continue pathway.








(3) Blood pressure elevated without history of HTN


Onset Date: ~18   Current Visit: Yes   Status: Acute   


Plan to address problem: 


MGSO4 until 2100  Mag levels q6hrs








Subjective





- Subjective


Date of service: 18 (pt in good spirits No c/o voiced)


Principal diagnosis: Day# 1 s/p  section; DM; elevated BP


Patient reports: appetite normal, voiding normally, pain well controlled, 

ambulating normally


: in NICU (tachypenea)





Objective





- Vital Signs


Latest vital signs: 


 Vital Signs











  Temp Pulse Resp BP BP Pulse Ox


 


 18 06:15    16   


 


 18 04:35  98.2 F  83  18   119/77  98


 


 18 23:10  97.4 F L  78  18   133/83  98


 


 18 21:00   77  15  135/89   98


 


 18 20:55  98.6 F  79  14  146/87   97


 


 18 20:50   82  14  137/91   98


 


 18 20:45   80  12  147/91   98


 


 18 20:40   79  20  157/94   99


 


 18 20:35   81  23  153/93   98


 


 18 20:30   79  19  150/91   98


 


 18 20:25   77  20  163/94   97


 


 18 20:20   76  20  162/94   99


 


 18 20:15   78  18  153/92   97


 


 /17/18 20:10   77  22  152/93   97


 


 /17/18 20:06   79  18  140/70   98


 


 /17/18 20:00   76  19  148/92   98


 


 17/18 19:55   77  21  158/97   99


 


 /17/18 19:50   77  20  160/95   98


 


 /17/18 19:45   78  18  157/96   99


 


 17/18 19:40   81  21  153/99   99


 


 17/18 19:36   86  19  151/94   100


 


 17/18 19:30   78  21  150/103   99


 


 17/18 19:28   79  22    98


 


 17/18 18:00   55 L     82 L


 


 17/18 17:31       78 L


 


 17/18 17:23       83 L


 


 17/18 17:17   74     82 L


 


 17/18 17:12   79     81 L


 


 17/18 17:06   89     99


 


 17/18 17:01   74     98


 


 17/18 16:56   76     98


 


 17/18 16:51   75     98


 


 17/18 16:50   77   128/83  


 


 17/18 16:46   73     98


 


 17/18 16:41   75     97


 


 17/18 16:36   77     98


 


 17/18 16:32  97.9 F     


 


 17/18 16:31   77     98


 


 17/18 16:26   79     98


 


 17/18 16:21   74     98


 


 17/18 16:16   75     98


 


 17/18 16:11   76     97


 


 17/18 16:06   76     97


 


 17/18 16:01   76     98


 


 17/18 15:56   74     97


 


 /17/18 15:51   75     98


 


 /17/18 15:49   74   127/78  


 


 /17/18 15:46   75     98


 


 /17/18 15:41   71     98


 


 /17/18 15:36   74     98


 


 /17/18 15:31   72     98


 


 /17/18 15:26   71     96


 


 /17/18 15:21   77     99


 


 17/18 15:16   71     98


 


 /17/18 15:11   72     98


 


 /17/18 15:06   74     97


 


 /17/18 15:01   71     96


 


 17/18 14:56   72     96


 


 /17/18 14:51   70     99


 


 /17/18 14:49   71   135/82  


 


 17/18 14:46   71     97


 


 /17/18 14:41   70     97


 


 /17/18 14:36   72     96


 


 17/18 14:31   71     98


 


 17/18 14:26   71     98


 


 17/18 14:21   72     99


 


 17/18 14:17  97.5 F L  73  18   148/85  99


 


 17/18 14:16   73     99


 


 17/18 14:15   71   148/85  


 


 17/18 14:11   71     97


 


 17/18 14:06   76     97


 


 17/18 14:01   74     98


 


 17/18 13:56   75     98


 


 17/18 13:51   71     99


 


 17/18 13:46   72     97


 


 17/18 13:41   71     97


 


 17/18 13:40   71   149/83  


 


 17/18 13:36   69     99


 


 17/18 13:31   73     97


 


 17/18 13:28   71   154/86  


 


 17/18 13:26   75     98


 


 17/18 13:21   72     99


 


 17/18 13:18   70   135/88  


 


 17/18 13:16   72     99


 


 17/18 13:11   70     99


 


 17/18 13:08   68   139/86  


 


 17/18 13:06   69     100


 


 17/18 13:01   67     100


 


 17/18 12:58   68   138/85  


 


 17/18 12:56   71     98


 


 17/18 12:50   69     97


 


 17/18 12:49   70     81 L


 


 17/18 12:48   68   133/81  


 


 17/18 12:45   72     97


 


 17/18 12:40   70     97


 


 17/18 12:39   72   134/79  


 


 17/18 12:35   85     99


 


 17/18 12:30   76     97


 


 17/18 12:28   76   133/83  


 


 17/18 12:26   77   136/86  


 


 17/18 12:25   77     98


 


 17/18 12:24   76   127/81  


 


 17/18 12:22   83   130/82  


 


 17/18 12:20   84   128/86   98


 


 18 12:18   82   129/81  


 


 18 12:17   34 L     86


 


 18 12:16   93 H   125/88  


 


 18 12:15   82     98


 


 18 12:14   81   135/82  


 


 18 12:12   82   138/86  


 


 18 12:10   85   139/87   99


 


 18 12:08   82   144/88  


 


 18 12:06   77   144/83  


 


 18 12:05   77     100


 


 18 12:04   77   129/80  


 


 18 12:02   71   133/82  


 


 18 12:00   77   125/78   100


 


 18 11:58   76   119/76  


 


 18 11:56   78   119/79  


 


 18 11:55   83     100


 


 18 11:48   76   118/75  


 


 18 11:30   75   123/78  


 


 18 09:59  97.6 F  117 H    117/62 


 


 18 09:56   78   117/62  








 Intake and Output











 18





 14:59 22:59 06:59


 


Intake Total 974.644 8073.800 


 


Output Total  1000 200


 


Balance 157.800 352.800 -200


 


Intake:   


 


  .842 2276.800 


 


    Lactated Ringers 1,000 ml 116.667  





    @ 125 mls/hr IV AS   





    [facility] Rx#:[id number]   


 


    PITOCin/NS 30 UNIT/500ML 41.133 52.800 





    30 units In 500 ml @ 4   





    MILLIUNITS/MIN 4 mls/hr   





    IV Q30MIN [facility] Rx#:   





    [id number]   


 


Output:   


 


  Urine  1000 200


 


    Void  800 200


 


Other:   


 


  Total, Output Amount  800 200


 


  Estimated Blood Loss  800 














- Exam


Breasts: Present: normal


Cardiovascular: Present: Regular rate


Lungs: Present: Normal air movement


Abdomen: Present: normal appearance, soft, normal bowel sounds


Uterus: Present: normal, firm, fundal height below umbilicus


Extremities: Present: normal, edema


Deep Tendon Reflex Grade: Normal +2


Incision: Present: normal, dry, intact, dressed





- Labs


Labs: 


 Abnormal lab results











  18 Range/Units





  06:55 19:50 02:20 


 


Creatinine  0.4 L  0.5 L   (0.7-1.2)  mg/dL


 


Magnesium    4.50 H  (1.7-2.3)  mg/dL


 


Lactate Dehydrogenase   215 H   ()  units/L
Assessment and Plan


 Patient doing well, c/o cramping and contractions over night with cervidil. 

Plan reviewed to d/c cervidil @ 0830, regular meal and AM care then start 

pitocin. all questions addressed, verbalized understanding.








- Patient Problems


(1) Diabetes type 2, uncontrolled


Current Visit: Yes   Status: Acute   


Qualifiers: 


   Diabetes mellitus complication status: without complication 





(2) Oligohydramnios


Current Visit: Yes   Status: Acute   


Qualifiers: 


   Trimester: third trimester 


Plan to address problem: 


ARPITA 4 at [facility] yesterday








(3) Pregnancy with 37 or more completed weeks gestation


Current Visit: Yes   Status: Acute   





Subjective





- Subjective


Date of service: 01/16/18


Principal diagnosis: IUP @ 37+5 weeks, DM, oligio


Patient reports: no new complaints





Objective





- Vital Signs


Vital Signs: 


 Vital Signs - 12hr











  01/15/18 01/16/18 01/16/18





  23:32 00:01 02:58


 


Temperature  98.2 F 


 


Pulse Rate 75  98 H


 


Respiratory  20 





Rate   


 


Blood Pressure 128/71  


 


Blood Pressure   





[Left]   


 


O2 Sat by Pulse   98





Oximetry   














  01/16/18 01/16/18 01/16/18





  03:03 03:08 03:13


 


Temperature   


 


Pulse Rate 93 H 78 74


 


Respiratory   





Rate   


 


Blood Pressure   


 


Blood Pressure   





[Left]   


 


O2 Sat by Pulse 98 98 98





Oximetry   














  01/16/18 01/16/18 01/16/18





  03:18 03:23 03:28


 


Temperature   


 


Pulse Rate 74 72 73


 


Respiratory   





Rate   


 


Blood Pressure   


 


Blood Pressure   





[Left]   


 


O2 Sat by Pulse 96 96 96





Oximetry   














  01/16/18 01/16/18 01/16/18





  03:33 03:38 03:43


 


Temperature   


 


Pulse Rate 71 75 73


 


Respiratory   





Rate   


 


Blood Pressure   


 


Blood Pressure   





[Left]   


 


O2 Sat by Pulse 97 96 96





Oximetry   














  01/16/18 01/16/18 01/16/18





  03:48 03:53 03:58


 


Temperature   


 


Pulse Rate 72 74 73


 


Respiratory   





Rate   


 


Blood Pressure   


 


Blood Pressure   





[Left]   


 


O2 Sat by Pulse 96 96 95





Oximetry   














  01/16/18 01/16/18 01/16/18





  04:03 04:08 04:13


 


Temperature   


 


Pulse Rate 75 74 74


 


Respiratory   





Rate   


 


Blood Pressure   


 


Blood Pressure   





[Left]   


 


O2 Sat by Pulse 96 96 97





Oximetry   














  01/16/18 01/16/18 01/16/18





  04:18 04:23 04:28


 


Temperature   


 


Pulse Rate 73 78 74


 


Respiratory   





Rate   


 


Blood Pressure   


 


Blood Pressure   





[Left]   


 


O2 Sat by Pulse 95 96 89





Oximetry   














  01/16/18 01/16/18 01/16/18





  04:33 04:38 04:43


 


Temperature   


 


Pulse Rate 75 74 77


 


Respiratory   





Rate   


 


Blood Pressure   


 


Blood Pressure   





[Left]   


 


O2 Sat by Pulse 96 96 96





Oximetry   














  01/16/18 01/16/18 01/16/18





  04:48 04:53 04:58


 


Temperature   


 


Pulse Rate 78 77 77


 


Respiratory   





Rate   


 


Blood Pressure   


 


Blood Pressure   





[Left]   


 


O2 Sat by Pulse 97 96 96





Oximetry   














  01/16/18 01/16/18 01/16/18





  05:03 05:09 05:10


 


Temperature   


 


Pulse Rate 79 77 


 


Respiratory   





Rate   


 


Blood Pressure  134/75 


 


Blood Pressure   





[Left]   


 


O2 Sat by Pulse 96  94





Oximetry   














  01/16/18 01/16/18 01/16/18





  05:16 07:04 07:07


 


Temperature 97.1 F L 98.5 F 


 


Pulse Rate  82 73


 


Respiratory 20 18 





Rate   


 


Blood Pressure   133/74


 


Blood Pressure  133/74 





[Left]   


 


O2 Sat by Pulse  95 





Oximetry   














  01/16/18 01/16/18 01/16/18





  07:09 07:14 07:19


 


Temperature   


 


Pulse Rate 70 75 73


 


Respiratory   





Rate   


 


Blood Pressure   


 


Blood Pressure   





[Left]   


 


O2 Sat by Pulse 97 94 95





Oximetry   














  01/16/18





  07:24


 


Temperature 


 


Pulse Rate 80


 


Respiratory 





Rate 


 


Blood Pressure 


 


Blood Pressure 





[Left] 


 


O2 Sat by Pulse 95





Oximetry 














- Exam


Breasts: normal


Cardiovascular: Regular rate


Lungs: Clear to auscultation, Normal air movement


Abdomen: Present: normal appearance, soft, normal bowel sounds


Vulva: both: normal


Uterus: Present: normal


FHR: auscultation normal, category 1


Uterine Contraction Monitor Mode: External


Uterine Contraction Pattern: Irregular


Uterine Tone Measurement Phase: Contraction


Uterine Contraction Intensity: Mild


Extremities: normal


Deep Tendon Reflex Grade: Normal +2





- Labs


Labs: 


 Laboratory Results - last 24 hr











  01/15/18 01/15/18





  17:12 17:12


 


WBC  5.5 


 


RBC  4.29 


 


Hgb  12.5 


 


Hct  37.3 


 


MCV  87 


 


MCH  29 


 


MCHC  33 


 


RDW  14.7 


 


Plt Count  179 


 


Blood Type   O POSITIVE


 


Antibody Screen   Negative
Assessment and Plan


 no cervical change, cervix remains unfavorable. Plan discussed to d/c pitocin, 

allow regular diet and patient may ambulate x1 hour with reactive tracing. Plan 

to replace cervidil tonight. All questions addressed, verbalized understanding. 

Dr. Palma aware of patient's status and plan.








- Patient Problems


(1) Diabetes type 2, uncontrolled


Current Visit: Yes   Status: Acute   


Qualifiers: 


   Diabetes mellitus complication status: without complication 





(2) Oligohydramnios


Current Visit: Yes   Status: Acute   


Qualifiers: 


   Fetus number: single or unspecified fetus   Trimester: third trimester   

Qualified Code(s): O41.03X0 - Oligohydramnios, third trimester, not applicable 

or unspecified   





(3) Pregnancy with 37 or more completed weeks gestation


Current Visit: Yes   Status: Acute   





Subjective





- Subjective


Date of service: 01/16/18


Principal diagnosis: IUP @ 37+5 weeks, DM, oligio


Patient reports: no new complaints





Objective





- Vital Signs


Vital Signs: 


 Vital Signs - 12hr











  01/16/18 01/16/18 01/16/18





  07:04 07:07 07:09


 


Temperature 98.5 F  


 


Pulse Rate 82 73 70


 


Respiratory 18  





Rate   


 


Blood Pressure  133/74 


 


Blood Pressure 133/74  





[Left]   


 


O2 Sat by Pulse 95  97





Oximetry   














  01/16/18 01/16/18 01/16/18





  07:14 07:19 07:24


 


Temperature   


 


Pulse Rate 75 73 80


 


Respiratory   





Rate   


 


Blood Pressure   


 


Blood Pressure   





[Left]   


 


O2 Sat by Pulse 94 95 95





Oximetry   














  01/16/18 01/16/18 01/16/18





  10:42 12:32 12:37


 


Temperature   


 


Pulse Rate 67 66 69


 


Respiratory   





Rate   


 


Blood Pressure   


 


Blood Pressure   





[Left]   


 


O2 Sat by Pulse 94 97 98





Oximetry   














  01/16/18 01/16/18 01/16/18





  12:42 12:47 12:52


 


Temperature   


 


Pulse Rate 76 75 72


 


Respiratory   





Rate   


 


Blood Pressure   


 


Blood Pressure   





[Left]   


 


O2 Sat by Pulse 99 98 98





Oximetry   














  01/16/18 01/16/18 01/16/18





  12:57 13:02 13:07


 


Temperature   


 


Pulse Rate 75 69 73


 


Respiratory   





Rate   


 


Blood Pressure   


 


Blood Pressure   





[Left]   


 


O2 Sat by Pulse 97 97 98





Oximetry   














  01/16/18 01/16/18 01/16/18





  13:12 13:17 13:21


 


Temperature   


 


Pulse Rate 69 69 


 


Respiratory   





Rate   


 


Blood Pressure   


 


Blood Pressure   





[Left]   


 


O2 Sat by Pulse 97 97 86





Oximetry   














  01/16/18 01/16/18 01/16/18





  13:22 13:27 13:32


 


Temperature   


 


Pulse Rate 70 70 65


 


Respiratory   





Rate   


 


Blood Pressure   


 


Blood Pressure   





[Left]   


 


O2 Sat by Pulse 99 99 97





Oximetry   














  01/16/18 01/16/18 01/16/18





  13:37 13:42 13:47


 


Temperature   


 


Pulse Rate 73 75 69


 


Respiratory   





Rate   


 


Blood Pressure   


 


Blood Pressure   





[Left]   


 


O2 Sat by Pulse 99 98 98





Oximetry   














  01/16/18 01/16/18 01/16/18





  13:52 13:57 14:02


 


Temperature   


 


Pulse Rate 68 68 68


 


Respiratory   





Rate   


 


Blood Pressure   


 


Blood Pressure   





[Left]   


 


O2 Sat by Pulse 98 98 98





Oximetry   














  01/16/18 01/16/18 01/16/18





  14:07 14:12 16:12


 


Temperature   


 


Pulse Rate 71 69 75


 


Respiratory   





Rate   


 


Blood Pressure   133/78


 


Blood Pressure   





[Left]   


 


O2 Sat by Pulse 98 98 





Oximetry   














  01/16/18





  17:02


 


Temperature 


 


Pulse Rate 68


 


Respiratory 





Rate 


 


Blood Pressure 127/86


 


Blood Pressure 





[Left] 


 


O2 Sat by Pulse 





Oximetry 














- Exam


Breasts: normal


Cardiovascular: Regular rate


Lungs: Clear to auscultation, Normal air movement


Abdomen: Present: normal appearance, soft


Vulva: both: normal


Uterus: Present: normal


FHR: auscultation normal, category 1


Uterine Contraction Monitor Mode: External


Cervical Dilatation: 1


Cervical Effacement Percentage: 30


Fetal station: -3


Uterine Contraction Frequency (min): 2-3


Uterine Contraction Duration: 60


Uterine Contraction Pattern: Regular


Uterine Tone Measurement Phase: Contraction


Uterine Contraction Intensity: Moderate


Extremities: normal


Deep Tendon Reflex Grade: Normal +2





- Labs


Labs: 


 Laboratory Results - last 24 hr











  01/15/18 01/15/18 01/15/18





  17:12 17:12 17:12


 


WBC  5.5  


 


RBC  4.29  


 


Hgb  12.5  


 


Hct  37.3  


 


MCV  87  


 


MCH  29  


 


MCHC  33  


 


RDW  14.7  


 


Plt Count  179  


 


RPR   Nonreactive 


 


Blood Type    O POSITIVE


 


Antibody Screen    Negative
5

## 2020-12-18 NOTE — OPERATIVE REPORT
Operative Report


Operative Report: 





Date:      2018





Preoperative diagnosis:   1.  Intrauterine pregnancy at 37 weeks


         2.  Oligohydramnios


         3.  Failed induction


         4.  Type 2 diabetes mellitus





Postoperative diagnosis:   1.  Intrauterine pregnancy at 37 weeks


         2.  Oligohydramnios


         3.  Failed induction


         4.  Type 2 diabetes mellitus








Procedure:      Low uterine transverse incision for  delivery





Surgeon:      Mirta Suárez MD





Assistant:      Brenna Saenz and Terra Bender CNM





Anesthesia:      Epidural





Anesthesiologist:      EDELMIRA Godoy M.D.





Estimated blood loss:   800  mL





Urine out:      100 mL





Findings:      Live born male infant.  Weight 6 lbs. 1 oz.  Apgars 8 at 1 

minute and 9 at 9 minutes.  Uterus 4 cm intramural fibroid as well as a 2 cm 

posterior fundal pedunculated fibroid, tubes normal, ovaries normal. 





Procedure:      After risk, benefits, complications, consequences and 

alternatives for this procedure were discussed with patient and consents were 

reviewed and signed, she was taken to the OR where epidural anesthesia was 

bolused.  She was then placed in the left lateral tilt position, and prepped 

and draped in the usual sterile fashion.  Timeout was performed, and an 

appropriate level of anesthesia was noted, a Pfannenstiel incision was made and 

extended to the fascia which was incised and extended in the lateral 

directions.  The overlying fascia was sharply dissected away from the 

underlying rectus muscles in the superior and inferior directions.  The midline 

was entered bluntly.  The vesicouterine fold was incised and with blunt 

dissection the bladder flap was created.  A transverse incision was made in the 

lower uterine segment and extended in superiolateral direction with finger 

fractionation.  Scant clear fluid was noted.  The infant was delivered from 

cephalic position with vacuum assistance at 55 mmHg with 1 attempt.   Mouth and 

nose were bulb suctioned.  Spontaneous cry and excellent tone were noted.  Cord 

was doubly clamped and cut.  The infant was given to /resuscitation 

team present.  The placenta was manually extracted.  The uterus was then 

exteriorized and cleared of any further products of conception or placental 

tissue.  The incision was reapproximated using 0 Vicryl in a running 

interlocking stitch.  Further hemostasis was obtained 0 Vicryl in interrupted 

figure-of-eight fashion.  Grossly normal  tubes and ovaries were noted.  Once 

hemostasis was noted, the uterus was allowed back into the pelvic cavity.  The 

pelvis was irrigated with warm normal saline.  Again hemostasis was noted .  

Surgicel applied for further hemostasis.  Interceed was then placed to prevent 

adhesions.  Then attention was turned to the rectus muscles.  The rectus 

muscles reapproximated using 3-0 Vicryl in a simple interrupted stitch x 3. 

Once hemostasis was noted,  the fascia was reapproximated using 0 Vicryl  

running stitch fashion.  Once hemostasis was noted skin incision was 

reapproximated using 4-0 Vicryl on a Mick needle in a subcuticular manner.





Counts were correct 3.  Patient tolerated procedure well state recovery room 

in stable condition. <<----- Click to add NO pertinent Past Medical History No pertinent past medical history